# Patient Record
Sex: MALE | Race: WHITE | Employment: FULL TIME | URBAN - NONMETROPOLITAN AREA
[De-identification: names, ages, dates, MRNs, and addresses within clinical notes are randomized per-mention and may not be internally consistent; named-entity substitution may affect disease eponyms.]

---
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
## 2023-04-14 ENCOUNTER — APPOINTMENT (OUTPATIENT)
Dept: RADIOLOGY | Facility: IMAGING CENTER | Age: 18
End: 2023-04-14
Attending: NURSE PRACTITIONER
Payer: COMMERCIAL

## 2023-04-14 ENCOUNTER — OFFICE VISIT (OUTPATIENT)
Dept: URGENT CARE | Facility: PHYSICIAN GROUP | Age: 18
End: 2023-04-14
Payer: COMMERCIAL

## 2023-04-14 VITALS
HEIGHT: 66 IN | OXYGEN SATURATION: 98 % | WEIGHT: 158 LBS | DIASTOLIC BLOOD PRESSURE: 86 MMHG | TEMPERATURE: 98.5 F | RESPIRATION RATE: 16 BRPM | BODY MASS INDEX: 25.39 KG/M2 | SYSTOLIC BLOOD PRESSURE: 124 MMHG | HEART RATE: 106 BPM

## 2023-04-14 DIAGNOSIS — R07.9 CHEST PAIN, UNSPECIFIED TYPE: ICD-10-CM

## 2023-04-14 PROCEDURE — 71046 X-RAY EXAM CHEST 2 VIEWS: CPT | Mod: TC,FY | Performed by: RADIOLOGY

## 2023-04-14 PROCEDURE — 93000 ELECTROCARDIOGRAM COMPLETE: CPT | Performed by: NURSE PRACTITIONER

## 2023-04-14 PROCEDURE — 99214 OFFICE O/P EST MOD 30 MIN: CPT | Performed by: NURSE PRACTITIONER

## 2023-04-14 ASSESSMENT — ENCOUNTER SYMPTOMS
SYNCOPE: 0
PALPITATIONS: 0
SHORTNESS OF BREATH: 0
CHILLS: 0
VOMITING: 0
MYALGIAS: 0
NEAR-SYNCOPE: 0
EYE PAIN: 0
NAUSEA: 0
PND: 0
COUGH: 0
SORE THROAT: 0
FEVER: 0
CLAUDICATION: 0
DIZZINESS: 0
ORTHOPNEA: 0

## 2023-04-14 NOTE — PROGRESS NOTES
Subjective:   Wicho Chahal is a 17 y.o. male who presents for Chest Wall Pain (L side by shoulder, x5days)      Chest Pain   This is a new problem. Episode onset: 4 days; developed while he was working out.  No acute injury.  Pain is not reproducible. The onset quality is sudden. The problem occurs constantly. The problem has been unchanged. The pain is present in the lateral region. The pain is moderate. The quality of the pain is described as tightness and squeezing. The pain does not radiate. Pertinent negatives include no claudication, cough, dizziness, fever, nausea, near-syncope, orthopnea, palpitations, PND, shortness of breath, syncope or vomiting. The pain is aggravated by nothing. Treatments tried: Aspirin. The treatment provided no relief. There are no known risk factors.   Pertinent negatives for past medical history include no CAD, no congenital heart disease, no diabetes, no MI and no PE.   Pertinent negatives for family medical history include: no early MI.     Review of Systems   Constitutional:  Negative for chills and fever.   HENT:  Negative for sore throat.    Eyes:  Negative for pain.   Respiratory:  Negative for cough and shortness of breath.    Cardiovascular:  Positive for chest pain. Negative for palpitations, orthopnea, claudication, leg swelling, syncope, PND and near-syncope.   Gastrointestinal:  Negative for nausea and vomiting.   Genitourinary:  Negative for hematuria.   Musculoskeletal:  Negative for myalgias.   Skin:  Negative for rash.   Neurological:  Negative for dizziness.     Medications:    This patient does not have an active medication from one of the medication groupers.    Allergies: Patient has no known allergies.    Problem List: Wicho Chahal does not have any pertinent problems on file.    Surgical History:  No past surgical history on file.    Past Social Hx: Wicho Chahal  reports that he has never smoked. He has never used smokeless tobacco.  "    Past Family Hx:  Wicho Chahal family history is not on file.     Problem list, medications, and allergies reviewed by myself today in Epic.     Objective:     /86   Pulse (!) 106   Temp 36.9 °C (98.5 °F) (Temporal)   Resp 16   Ht 1.676 m (5' 6\")   Wt 71.7 kg (158 lb)   SpO2 98%   BMI 25.50 kg/m²     Physical Exam  Vitals and nursing note reviewed.   Constitutional:       General: He is not in acute distress.     Appearance: He is well-developed.   HENT:      Head: Normocephalic and atraumatic.      Right Ear: External ear normal.      Left Ear: External ear normal.      Nose: Nose normal.      Mouth/Throat:      Mouth: Mucous membranes are moist.   Eyes:      Conjunctiva/sclera: Conjunctivae normal.   Cardiovascular:      Rate and Rhythm: Normal rate.      Pulses: Normal pulses.      Heart sounds: Normal heart sounds, S1 normal and S2 normal.   Pulmonary:      Effort: Pulmonary effort is normal. No respiratory distress.      Breath sounds: Normal breath sounds.   Chest:      Chest wall: No swelling, tenderness, crepitus or edema.   Abdominal:      General: There is no distension.   Musculoskeletal:         General: Normal range of motion.   Skin:     General: Skin is warm and dry.   Neurological:      General: No focal deficit present.      Mental Status: He is alert and oriented to person, place, and time. Mental status is at baseline.      Gait: Gait (gait at baseline) normal.   Psychiatric:         Judgment: Judgment normal.       Assessment/Plan:     Diagnosis and associated orders:     1. Chest pain, unspecified type  DX-CHEST-2 VIEWS    EKG - Clinic Performed    REFERRAL TO CARDIOLOGY             Comments/MDM:     .EKG interpreted by provider    Rate: Normal  Rhythm: Regular Sinus   QRS Complex: Narrow   Axis: Normal  Interval/Conduction: Normal  Enlargement: None    Ischemia:      ST Segments: no acute change      T Waves: no acute change      Q Waves: none    Comparison: " NONE    Clinical Impression: no acute changes and normal EKG, see scanned sheet    I independently reviewed the patient's imaging and agree with the interpretation of the radiologist.    1.  Unremarkable two view chest.      I personally reviewed prior external notes and prior test results pertinent to today's visit. Acs risk factors 0, stable, patient in no acute distress.  Will place outpatient cardiology referral.  Discussed management options, risks and benefits, and alternatives to treatment plan agreed upon.   Red flags discussed and indications to immediately call 911 or present to the Emergency Department.   Supportive care, differential diagnoses, and indications for immediate follow-up discussed with patient.    Patient expresses understanding and agrees to plan. Patient denies any other questions or concerns.                Please note that this dictation was created using voice recognition software. I have made a reasonable attempt to correct obvious errors, but I expect that there are errors of grammar and possibly content that I did not discover before finalizing the note.    This note was electronically signed by Roderick SUTTON.

## 2023-04-19 ENCOUNTER — TELEPHONE (OUTPATIENT)
Dept: HEALTH INFORMATION MANAGEMENT | Facility: OTHER | Age: 18
End: 2023-04-19
Payer: COMMERCIAL

## 2023-07-17 ENCOUNTER — OFFICE VISIT (OUTPATIENT)
Dept: URGENT CARE | Facility: PHYSICIAN GROUP | Age: 18
End: 2023-07-17
Payer: COMMERCIAL

## 2023-07-17 VITALS
OXYGEN SATURATION: 98 % | HEART RATE: 66 BPM | TEMPERATURE: 97.7 F | WEIGHT: 170 LBS | BODY MASS INDEX: 27.32 KG/M2 | HEIGHT: 66 IN | DIASTOLIC BLOOD PRESSURE: 82 MMHG | SYSTOLIC BLOOD PRESSURE: 124 MMHG | RESPIRATION RATE: 14 BRPM

## 2023-07-17 DIAGNOSIS — L03.115 CELLULITIS OF RIGHT LOWER EXTREMITY: ICD-10-CM

## 2023-07-17 PROCEDURE — 3079F DIAST BP 80-89 MM HG: CPT | Performed by: FAMILY MEDICINE

## 2023-07-17 PROCEDURE — 3074F SYST BP LT 130 MM HG: CPT | Performed by: FAMILY MEDICINE

## 2023-07-17 PROCEDURE — 99213 OFFICE O/P EST LOW 20 MIN: CPT | Performed by: FAMILY MEDICINE

## 2023-07-17 RX ORDER — SULFAMETHOXAZOLE AND TRIMETHOPRIM 800; 160 MG/1; MG/1
1 TABLET ORAL 2 TIMES DAILY
Qty: 14 TABLET | Refills: 0 | Status: SHIPPED | OUTPATIENT
Start: 2023-07-17 | End: 2023-07-24

## 2023-07-17 NOTE — PROGRESS NOTES
"SUBJECTIVE      Chief Complaint   Patient presents with    Ingrown Toenail     (R) big toe x 1 week.                 This is a new problem.       C/o rt great toe pain and redness x 1 wk.       The problem has been gradually worsening since onset.         Pt was exposed to nothing. Pertinent negatives include no congestion, cough, fatigue, fever or shortness of breath. Past treatments include nothing.     History   Substance Use Topics    Smoking status: Never Smoker     Smokeless tobacco: No     Alcohol Use: No      Past medical history was unremarkable and not pertinent to current issue      No family history on file.      Review of Systems   Constitutional: Negative for fever and fatigue.   HENT: Negative for congestion.    Respiratory: Negative for cough and shortness of breath.    Cardiovascular: Negative for chest pain.   Gastrointestinal: Negative for abdominal pain.   Skin: Positive for rash.   Neurological: Negative for dizziness.   All other systems reviewed and are negative.         Objective:     /82   Pulse 66   Temp 36.5 °C (97.7 °F) (Temporal)   Resp 14   Ht 1.676 m (5' 6\")   Wt 77.1 kg (170 lb)   SpO2 98%       Physical Exam   Constitutional: pt is oriented to person, place, and time. Pt appears well-developed and well-nourished. No distress.   HENT:   Head: Normocephalic and atraumatic.   Eyes: Conjunctivae are normal. No scleral icterus.   Cardiovascular: Normal rate and regular rhythm.    Pulmonary/Chest: Effort normal and breath sounds normal. No respiratory distress.        Neurological: pt is alert and oriented to person, place, and time. No cranial nerve deficit.   Skin: Skin is warm. Pt is not diaphoretic.      Area of erythema, warmth, tender to touch over rt great toe and there is ingrown nail.      Nursing note and vitals reviewed.              Assessment/Plan:     1. Cellulitis of right lower extremity     - sulfamethoxazole-trimethoprim (BACTRIM DS) 800-160 MG tablet; Take 1 " Tablet by mouth 2 times a day for 7 days.  Dispense: 14 Tablet; Refill: 0       - Referral to Podiatry      Follow up in one week if no improvement, sooner if symptoms worsen.

## 2024-02-24 ENCOUNTER — OFFICE VISIT (OUTPATIENT)
Dept: URGENT CARE | Facility: PHYSICIAN GROUP | Age: 19
End: 2024-02-24
Payer: COMMERCIAL

## 2024-02-24 VITALS
BODY MASS INDEX: 28.93 KG/M2 | WEIGHT: 180 LBS | OXYGEN SATURATION: 100 % | TEMPERATURE: 97.9 F | RESPIRATION RATE: 16 BRPM | HEART RATE: 106 BPM | HEIGHT: 66 IN | SYSTOLIC BLOOD PRESSURE: 132 MMHG | DIASTOLIC BLOOD PRESSURE: 72 MMHG

## 2024-02-24 DIAGNOSIS — Z20.2 EXPOSURE TO CHLAMYDIA: ICD-10-CM

## 2024-02-24 PROCEDURE — 3075F SYST BP GE 130 - 139MM HG: CPT | Performed by: FAMILY MEDICINE

## 2024-02-24 PROCEDURE — 3078F DIAST BP <80 MM HG: CPT | Performed by: FAMILY MEDICINE

## 2024-02-24 PROCEDURE — 99213 OFFICE O/P EST LOW 20 MIN: CPT | Performed by: FAMILY MEDICINE

## 2024-02-24 RX ORDER — AZITHROMYCIN 500 MG/1
TABLET, FILM COATED ORAL
Qty: 2 TABLET | Refills: 0 | Status: SHIPPED | OUTPATIENT
Start: 2024-02-24

## 2024-02-24 NOTE — PROGRESS NOTES
Chief Complaint:    Chief Complaint   Patient presents with    Exposure to STD     Exposed to Chlamydia, no symptoms,        History of Present Illness:    He found out sexual partner had Chlamydia a week ago. He denies any symptoms.      Past Medical History:    Past Medical History:   Diagnosis Date    Seizure (HCC)     had after Dtap     Past Surgical History:    No past surgical history on file.    Social History:    Social History     Socioeconomic History    Marital status: Single     Spouse name: Not on file    Number of children: Not on file    Years of education: Not on file    Highest education level: Not on file   Occupational History    Not on file   Tobacco Use    Smoking status: Never    Smokeless tobacco: Never   Vaping Use    Vaping Use: Never used   Substance and Sexual Activity    Alcohol use: Never    Drug use: Never    Sexual activity: Not on file   Other Topics Concern    Behavioral problems Not Asked    Interpersonal relationships Not Asked    Sad or not enjoying activities Not Asked    Suicidal thoughts Not Asked    Poor school performance Not Asked    Reading difficulties Not Asked    Speech difficulties Not Asked    Writing difficulties Not Asked    Inadequate sleep Not Asked    Excessive TV viewing Not Asked    Excessive video game use Not Asked    Inadequate exercise Not Asked    Sports related Not Asked    Poor diet Not Asked    Family concerns for drug/alcohol abuse Not Asked    Poor oral hygiene Not Asked    Bike safety Not Asked    Family concerns vehicle safety Not Asked   Social History Narrative    ** Merged History Encounter **         ** Merged History Encounter **          Social Determinants of Health     Financial Resource Strain: Not on file   Food Insecurity: Not on file   Transportation Needs: Not on file   Physical Activity: Not on file   Stress: Not on file   Social Connections: Not on file   Intimate Partner Violence: Not on file   Housing Stability: Not on file  "    Family History:    No family history on file.    Medications:    No current outpatient medications on file prior to visit.     No current facility-administered medications on file prior to visit.     Allergies:    No Known Allergies      Vitals:    Vitals:    24 1231   BP: 132/72   Pulse: (!) 106   Resp: 16   Temp: 36.6 °C (97.9 °F)   TempSrc: Temporal   SpO2: 100%   Weight: 81.6 kg (180 lb)   Height: 1.676 m (5' 6\")       Physical Exam:    Constitutional: Vital signs reviewed. Appears well-developed and well-nourished. No acute distress.   Eyes: Sclera white, conjunctivae clear.   ENT: External ears normal. Hearing normal.   Neck: Neck supple.   Pulmonary/Chest: Respirations non-labored.   Musculoskeletal: Normal gait. No muscular atrophy or weakness.  Neurological: Alert and oriented to person, place, and time. Muscle tone normal. Coordination normal.  Skin: No rashes or lesions. Warm, dry, normal turgor.  Psychiatric: Normal mood and affect. Behavior is normal. Judgment and thought content normal.       Assessment / Plan & Medical Decision Makin. Exposure to chlamydia  - azithromycin (ZITHROMAX) 500 MG tablet; 2 TABS BY MOUTH X 1 DOSE.  Dispense: 2 Tablet; Refill: 0       Discussed with him DDX, management options, and risks, benefits, and alternatives to treatment plan agreed upon.    He found out sexual partner had Chlamydia a week ago. He denies any symptoms.    He prefers to be treated for Chlamydia without any testing (concerned about test cost).    Agreeable to medication prescribed. Advised to avoid sex x 1 week.    He will return to urgent care if needed.   "

## 2024-04-17 ENCOUNTER — OFFICE VISIT (OUTPATIENT)
Dept: URGENT CARE | Facility: PHYSICIAN GROUP | Age: 19
End: 2024-04-17
Payer: COMMERCIAL

## 2024-04-17 VITALS
BODY MASS INDEX: 28.93 KG/M2 | SYSTOLIC BLOOD PRESSURE: 112 MMHG | HEIGHT: 66 IN | RESPIRATION RATE: 14 BRPM | OXYGEN SATURATION: 95 % | WEIGHT: 180 LBS | HEART RATE: 79 BPM | TEMPERATURE: 98.1 F | DIASTOLIC BLOOD PRESSURE: 62 MMHG

## 2024-04-17 DIAGNOSIS — Z23 NEED FOR TETANUS BOOSTER: ICD-10-CM

## 2024-04-17 DIAGNOSIS — B96.89 BACTERIAL INFECTION OF SKIN: ICD-10-CM

## 2024-04-17 DIAGNOSIS — L08.9 BACTERIAL INFECTION OF SKIN: ICD-10-CM

## 2024-04-17 PROCEDURE — 90471 IMMUNIZATION ADMIN: CPT | Performed by: NURSE PRACTITIONER

## 2024-04-17 PROCEDURE — 90715 TDAP VACCINE 7 YRS/> IM: CPT | Performed by: NURSE PRACTITIONER

## 2024-04-17 PROCEDURE — 99213 OFFICE O/P EST LOW 20 MIN: CPT | Mod: 25 | Performed by: NURSE PRACTITIONER

## 2024-04-17 PROCEDURE — 3078F DIAST BP <80 MM HG: CPT | Performed by: NURSE PRACTITIONER

## 2024-04-17 PROCEDURE — 3074F SYST BP LT 130 MM HG: CPT | Performed by: NURSE PRACTITIONER

## 2024-04-17 RX ORDER — SULFAMETHOXAZOLE AND TRIMETHOPRIM 800; 160 MG/1; MG/1
1 TABLET ORAL 2 TIMES DAILY
Qty: 14 TABLET | Refills: 0 | Status: SHIPPED | OUTPATIENT
Start: 2024-04-17 | End: 2024-04-24

## 2024-04-18 NOTE — PROGRESS NOTES
"Subjective:   Wicho Chahal is a 18 y.o. male who presents for Hand Pain (X5 days R hand pain, swelling, yellow puss, tattoo infection, )    Patient is a 18-year-old male presents clinic today reporting 5-day history of right hand pain, swelling, yellow thick discharge from new tattoo.  He states the area is slightly tender to palpation.  He has not had any fevers, chills, or decreased  strength.  He has been using over-the-counter triple antibiotic ointment and Borax to clean the area.  Patient is unsure of his last tetanus.      Medications, Allergies, and current problem list reviewed today in Epic.     Objective:     /62   Pulse 79   Temp 36.7 °C (98.1 °F) (Temporal)   Resp 14   Ht 1.676 m (5' 6\")   Wt 81.6 kg (180 lb)   SpO2 95%     Physical Exam  Vitals reviewed.   Constitutional:       Appearance: Normal appearance.   HENT:      Head: Normocephalic.      Nose: Nose normal.      Mouth/Throat:      Mouth: Mucous membranes are moist.   Eyes:      Extraocular Movements: Extraocular movements intact.      Conjunctiva/sclera: Conjunctivae normal.      Pupils: Pupils are equal, round, and reactive to light.   Cardiovascular:      Rate and Rhythm: Normal rate.   Pulmonary:      Effort: Pulmonary effort is normal.   Musculoskeletal:         General: Normal range of motion.      Cervical back: Normal range of motion and neck supple.   Skin:     General: Skin is warm and dry.      Comments: Right hand: New tattoo present, center area is raised, erythematous, with yellow discharge.  Tender to palpation.    Neurological:      General: No focal deficit present.      Mental Status: He is alert and oriented to person, place, and time.   Psychiatric:         Mood and Affect: Mood normal.         Behavior: Behavior normal.         Assessment/Plan:     Diagnosis and associated orders:     1. Bacterial infection of skin  sulfamethoxazole-trimethoprim (BACTRIM DS) 800-160 MG tablet      2. Need for tetanus " booster  Tdap Vaccine =>6YO IM         Comments/MDM:     This acute condition.  Patient presents to clinic today with infection due to new l placed tattoo 5 days ago.  There is yellow discharge present.  Will go ahead and treat with Bactrim.  Tetanus was updated.  Recommended over-the-counter antibacterial hand soap and Aquaphor or triple antibiotic ointment as needed.  Stressed the patient the importance of keeping it covered while at work and change bandage frequently.  If symptoms or not improving in the next 3 to 4 days recommended representing in clinic or sooner if symptoms worsen.  Patient was involved with shared decision-making throughout the exam today and verbalizes understanding regards to plan of care, discharge instructions, and follow-up         Differential diagnosis, natural history, supportive care, and indications for immediate follow-up discussed.    Advised the patient to follow-up with the primary care physician for recheck, reevaluation, and consideration of further management.    I personally reviewed prior external notes and test results pertinent to today's visit as well as additional imaging and testing completed in clinic today.     Please note that this dictation was created using voice recognition software. I have made a reasonable attempt to correct obvious errors, but I expect that there are errors of grammar and possibly content that I did not discover before finalizing the note.

## 2024-09-12 ENCOUNTER — OFFICE VISIT (OUTPATIENT)
Dept: URGENT CARE | Facility: PHYSICIAN GROUP | Age: 19
End: 2024-09-12
Payer: COMMERCIAL

## 2024-09-12 ENCOUNTER — APPOINTMENT (OUTPATIENT)
Dept: URGENT CARE | Facility: PHYSICIAN GROUP | Age: 19
End: 2024-09-12
Payer: COMMERCIAL

## 2024-09-12 VITALS
RESPIRATION RATE: 16 BRPM | HEART RATE: 97 BPM | HEIGHT: 67 IN | SYSTOLIC BLOOD PRESSURE: 124 MMHG | OXYGEN SATURATION: 98 % | BODY MASS INDEX: 29.82 KG/M2 | DIASTOLIC BLOOD PRESSURE: 72 MMHG | WEIGHT: 190 LBS | TEMPERATURE: 98.8 F

## 2024-09-12 DIAGNOSIS — J02.9 ACUTE PHARYNGITIS, UNSPECIFIED ETIOLOGY: ICD-10-CM

## 2024-09-12 DIAGNOSIS — R68.89 FLU-LIKE SYMPTOMS: ICD-10-CM

## 2024-09-12 DIAGNOSIS — R05.1 ACUTE COUGH: ICD-10-CM

## 2024-09-12 DIAGNOSIS — J06.9 VIRAL URI WITH COUGH: ICD-10-CM

## 2024-09-12 LAB
FLUAV RNA SPEC QL NAA+PROBE: NEGATIVE
FLUBV RNA SPEC QL NAA+PROBE: NEGATIVE
RSV RNA SPEC QL NAA+PROBE: NEGATIVE
S PYO DNA SPEC NAA+PROBE: NOT DETECTED
SARS-COV-2 RNA RESP QL NAA+PROBE: NEGATIVE

## 2024-09-12 PROCEDURE — 0241U POCT CEPHEID COV-2, FLU A/B, RSV - PCR: CPT | Performed by: NURSE PRACTITIONER

## 2024-09-12 PROCEDURE — 3078F DIAST BP <80 MM HG: CPT | Performed by: NURSE PRACTITIONER

## 2024-09-12 PROCEDURE — 99213 OFFICE O/P EST LOW 20 MIN: CPT | Performed by: NURSE PRACTITIONER

## 2024-09-12 PROCEDURE — 3074F SYST BP LT 130 MM HG: CPT | Performed by: NURSE PRACTITIONER

## 2024-09-12 PROCEDURE — 87651 STREP A DNA AMP PROBE: CPT | Performed by: NURSE PRACTITIONER

## 2024-09-12 RX ORDER — DEXTROMETHORPHAN HYDROBROMIDE AND PROMETHAZINE HYDROCHLORIDE 15; 6.25 MG/5ML; MG/5ML
5 SYRUP ORAL EVERY 6 HOURS PRN
Qty: 100 ML | Refills: 0 | Status: SHIPPED | OUTPATIENT
Start: 2024-09-12

## 2024-09-12 NOTE — LETTER
[facility]  [address]  [facility] [id number]     September 12, 2024    Patient: Wicho Chahal   YOB: 2005   Date of Visit: 9/12/2024       To Whom It May Concern:    Wicho Chahal was seen and treated in our department on 9/12/2024.  Will need to be excused from work due to illness return back to work on 9/16/2024 as long as he is 24 hours fever free.    Sincerely,     JAKE Hatfield.

## 2024-09-12 NOTE — PROGRESS NOTES
"Subjective:   Wicho Chahal is a 18 y.o. male who presents for Cough, Pharyngitis (X 2-3 days), Congestion, and Body Aches    This is an acute condition.  Patient is a 18-year-old male who presents clinic today with 3-day history of nasal congestion, headaches, body aches, chills, cough, sore throat, and fatigue.  Patient denies any chest pain, palpitations, shortness of breath, rashes, or nausea, vomiting, diarrhea.  Patient has been taking over-the-counter cold and flu medications.  No known sick contact.    Medications, Allergies, and current problem list reviewed today in Epic.     Objective:     /72   Pulse 97   Temp 37.1 °C (98.8 °F) (Temporal)   Resp 16   Ht 1.702 m (5' 7\")   Wt 86.2 kg (190 lb)   SpO2 98%     Physical Exam  Vitals reviewed.   Constitutional:       General: He is not in acute distress.     Appearance: Normal appearance. He is ill-appearing. He is not toxic-appearing.   HENT:      Head: Normocephalic.      Right Ear: Tympanic membrane, ear canal and external ear normal.      Left Ear: Tympanic membrane, ear canal and external ear normal.      Nose: Mucosal edema, congestion and rhinorrhea present.      Right Turbinates: Swollen.      Left Turbinates: Swollen.      Right Sinus: No maxillary sinus tenderness or frontal sinus tenderness.      Left Sinus: No maxillary sinus tenderness or frontal sinus tenderness.      Mouth/Throat:      Lips: No lesions.      Mouth: Mucous membranes are moist.      Pharynx: Uvula midline. Posterior oropharyngeal erythema present. No pharyngeal swelling, oropharyngeal exudate, uvula swelling or postnasal drip.      Tonsils: No tonsillar exudate.   Eyes:      Extraocular Movements: Extraocular movements intact.      Conjunctiva/sclera: Conjunctivae normal.      Pupils: Pupils are equal, round, and reactive to light.   Cardiovascular:      Rate and Rhythm: Normal rate and regular rhythm.      Heart sounds: No murmur heard.  Pulmonary:      Effort: " Pulmonary effort is normal. No respiratory distress.      Breath sounds: Normal breath sounds. No stridor. No wheezing, rhonchi or rales.   Musculoskeletal:         General: Normal range of motion.      Cervical back: Normal range of motion and neck supple.   Lymphadenopathy:      Cervical: No cervical adenopathy.   Skin:     General: Skin is warm and dry.   Neurological:      General: No focal deficit present.      Mental Status: He is alert and oriented to person, place, and time.   Psychiatric:         Mood and Affect: Mood normal.         Behavior: Behavior normal.       Results for orders placed or performed in visit on 09/12/24   POCT Cepheid CoV-2, Flu A/B, RSV - PCR   Result Value Ref Range    SARS-CoV-2 by PCR Negative Negative, Invalid    Influenza virus A RNA Negative Negative, Invalid    Influenza virus B, PCR Negative Negative, Invalid    RSV, PCR Negative Negative, Invalid   POCT CEPHEID GROUP A STREP - PCR   Result Value Ref Range    POC Group A Strep, PCR Not Detected Not Detected, Invalid         Assessment/Plan:     Diagnosis and associated orders:     1. Viral URI with cough        2. Flu-like symptoms  POCT Cepheid CoV-2, Flu A/B, RSV - PCR      3. Acute pharyngitis, unspecified etiology  POCT CEPHEID GROUP A STREP - PCR      4. Acute cough  promethazine-dextromethorphan (PROMETHAZINE-DM) 6.25-15 MG/5ML syrup         Comments/MDM:     POCT COVID, influenza, RSV, strep all negative.  HPI physical exam finds are consistent with viral illness.  Lung sounds are clear throughout.  Low suspicion at this time for pneumonia.  Vital signs are all within normal range.   Promethazine DM was prescribed.  Side effects medication discussed.  Did advise patient on conservative measures for management of symptoms.  Patient is agreeable to pursue adequate rest, adequate hydration, saltwater gargle and nasal saline and nasal toileting for any symptoms of upper respiratory congestion.  Over-the-counter analgesia  and antipyretics on a p.r.n. basis as needed for pain and fever.  Did discuss over-the-counter cough medications and to  follow manufactures dosing and safety guidelines.    Recommended following up in clinic if symptoms acutely worsen or if symptoms/fever persist past 7 to 10 days.   Work note provided.  Patient was involved with shared decision-making throughout the exam today and verbalizes understanding regards to plan of care, discharge instructions, and follow-up         Differential diagnosis, natural history, supportive care, and indications for immediate follow-up discussed.    Advised the patient to follow-up with the primary care physician for recheck, reevaluation, and consideration of further management.    I personally reviewed prior external notes and test results pertinent to today's visit as well as additional imaging and testing completed in clinic today.     Please note that this dictation was created using voice recognition software. I have made a reasonable attempt to correct obvious errors, but I expect that there are errors of grammar and possibly content that I did not discover before finalizing the note.

## 2025-07-25 ENCOUNTER — OFFICE VISIT (OUTPATIENT)
Dept: URGENT CARE | Facility: PHYSICIAN GROUP | Age: 20
End: 2025-07-25

## 2025-07-25 VITALS
OXYGEN SATURATION: 96 % | WEIGHT: 174.3 LBS | SYSTOLIC BLOOD PRESSURE: 118 MMHG | TEMPERATURE: 98.7 F | RESPIRATION RATE: 16 BRPM | HEART RATE: 103 BPM | DIASTOLIC BLOOD PRESSURE: 80 MMHG | BODY MASS INDEX: 27.3 KG/M2

## 2025-07-25 DIAGNOSIS — B35.4 TINEA CORPORIS: Primary | ICD-10-CM

## 2025-07-25 DIAGNOSIS — R00.0 TACHYCARDIA: ICD-10-CM

## 2025-07-25 PROCEDURE — 3074F SYST BP LT 130 MM HG: CPT | Performed by: FAMILY MEDICINE

## 2025-07-25 PROCEDURE — 99214 OFFICE O/P EST MOD 30 MIN: CPT | Performed by: FAMILY MEDICINE

## 2025-07-25 PROCEDURE — 3079F DIAST BP 80-89 MM HG: CPT | Performed by: FAMILY MEDICINE

## 2025-07-25 RX ORDER — FLUCONAZOLE 100 MG/1
100 TABLET ORAL
Qty: 4 TABLET | Refills: 0 | Status: SHIPPED | OUTPATIENT
Start: 2025-07-25

## 2025-07-25 NOTE — PROGRESS NOTES
Subjective:      19 y.o. male presents to urgent care for concerns of possible ringworm.  A couple of days ago he noticed a rash to his right arm and 1 on his back.  There is no associated itch.  He has tried an OTC antifungal body wash and athlete's foot spray without significant change in symptoms.  He did recently adopt a cat that was diagnosed with ringworm.    Heart rate is elevated today in urgent care.  He denies any chest pain, palpitations, or shortness of breath.    He denies any other questions or concerns at this time.    Current problem list, medication, and past medical/surgical history were reviewed in Epic.    ROS  See HPI     Objective:      /80   Pulse (!) 103   Temp 37.1 °C (98.7 °F) (Temporal)   Resp 16   Wt 79.1 kg (174 lb 4.8 oz)   SpO2 96%   BMI 27.30 kg/m²     Physical Exam  Constitutional:       General: He is not in acute distress.     Appearance: He is not diaphoretic.   Cardiovascular:      Rate and Rhythm: Regular rhythm. Tachycardia present.      Heart sounds: Normal heart sounds.   Pulmonary:      Effort: Pulmonary effort is normal. No respiratory distress.      Breath sounds: Normal breath sounds.   Skin:     Findings: Rash (one lesion noted to his right upper extremity, and 1 on his back.  They are annular in shape, with red raised borders and central clearing) present.   Neurological:      Mental Status: He is alert.   Psychiatric:         Mood and Affect: Affect normal.         Judgment: Judgment normal.       Assessment/Plan:     1. Tinea corporis (Primary)  2. Tachycardia  Systemic symptoms seen through tachycardia.  Prescription for Diflucan has been sent.  - fluconazole (DIFLUCAN) 100 MG Tab; Take 1 Tablet by mouth every 7 days.  Dispense: 4 Tablet; Refill: 0          Instructed to return to Urgent Care or nearest Emergency Department if symptoms fail to improve, for any change in condition, further concerns, or new concerning symptoms. Patient states  understanding of the plan of care and discharge instructions.    Ana Saenz M.D.